# Patient Record
Sex: MALE | Race: WHITE | URBAN - METROPOLITAN AREA
[De-identification: names, ages, dates, MRNs, and addresses within clinical notes are randomized per-mention and may not be internally consistent; named-entity substitution may affect disease eponyms.]

---

## 2022-08-18 ENCOUNTER — NEW PATIENT (OUTPATIENT)
Dept: URBAN - METROPOLITAN AREA CLINIC 26 | Facility: CLINIC | Age: 52
End: 2022-08-18

## 2022-08-18 VITALS
SYSTOLIC BLOOD PRESSURE: 112 MMHG | WEIGHT: 205 LBS | HEART RATE: 80 BPM | BODY MASS INDEX: 28.7 KG/M2 | HEIGHT: 71 IN | DIASTOLIC BLOOD PRESSURE: 79 MMHG

## 2022-08-18 DIAGNOSIS — H43.393: ICD-10-CM

## 2022-08-18 DIAGNOSIS — H35.413: ICD-10-CM

## 2022-08-18 DIAGNOSIS — H35.461: ICD-10-CM

## 2022-08-18 DIAGNOSIS — H35.342: ICD-10-CM

## 2022-08-18 DIAGNOSIS — H35.373: ICD-10-CM

## 2022-08-18 PROCEDURE — 92134 CPTRZ OPH DX IMG PST SGM RTA: CPT

## 2022-08-18 PROCEDURE — 99204 OFFICE O/P NEW MOD 45 MIN: CPT

## 2022-08-18 PROCEDURE — 92250 FUNDUS PHOTOGRAPHY W/I&R: CPT

## 2022-08-18 ASSESSMENT — VISUAL ACUITY
OS_SC: 20/40-2
OD_SC: 20/40+1

## 2022-08-18 ASSESSMENT — TONOMETRY
OS_IOP_MMHG: 12
OD_IOP_MMHG: 11

## 2022-09-14 ENCOUNTER — FOLLOW UP (OUTPATIENT)
Dept: URBAN - METROPOLITAN AREA CLINIC 26 | Facility: CLINIC | Age: 52
End: 2022-09-14

## 2022-09-14 DIAGNOSIS — H35.461: ICD-10-CM

## 2022-09-14 DIAGNOSIS — H35.373: ICD-10-CM

## 2022-09-14 DIAGNOSIS — H35.343: ICD-10-CM

## 2022-09-14 DIAGNOSIS — H35.041: ICD-10-CM

## 2022-09-14 DIAGNOSIS — H35.413: ICD-10-CM

## 2022-09-14 DIAGNOSIS — H43.393: ICD-10-CM

## 2022-09-14 PROCEDURE — 92012 INTRM OPH EXAM EST PATIENT: CPT

## 2022-09-14 PROCEDURE — 92134 CPTRZ OPH DX IMG PST SGM RTA: CPT

## 2022-09-14 ASSESSMENT — VISUAL ACUITY
OS_SC: 20/50-2
OD_SC: 20/40-1
OD_PH: 20/30
OS_PH: 20/25-2

## 2022-09-14 ASSESSMENT — TONOMETRY
OD_IOP_MMHG: 14
OS_IOP_MMHG: 13

## 2022-12-16 ENCOUNTER — FOLLOW UP (OUTPATIENT)
Dept: URBAN - METROPOLITAN AREA CLINIC 33 | Facility: CLINIC | Age: 52
End: 2022-12-16

## 2022-12-16 VITALS — DIASTOLIC BLOOD PRESSURE: 92 MMHG | SYSTOLIC BLOOD PRESSURE: 131 MMHG | HEIGHT: 60 IN | HEART RATE: 83 BPM

## 2022-12-16 PROCEDURE — 92134 CPTRZ OPH DX IMG PST SGM RTA: CPT

## 2022-12-16 PROCEDURE — 92250 FUNDUS PHOTOGRAPHY W/I&R: CPT

## 2022-12-16 PROCEDURE — 92235 FLUORESCEIN ANGRPH MLTIFRAME: CPT

## 2022-12-16 PROCEDURE — 92012 INTRM OPH EXAM EST PATIENT: CPT

## 2022-12-16 ASSESSMENT — VISUAL ACUITY
OS_SC: 20/25+1
OD_SC: 20/20

## 2022-12-16 ASSESSMENT — TONOMETRY
OS_IOP_MMHG: 11
OD_IOP_MMHG: 12

## 2023-06-30 ENCOUNTER — COMPREHENSIVE EXAM (OUTPATIENT)
Dept: URBAN - METROPOLITAN AREA CLINIC 33 | Facility: CLINIC | Age: 53
End: 2023-06-30

## 2023-06-30 VITALS — WEIGHT: 218 LBS | BODY MASS INDEX: 30.86 KG/M2 | HEIGHT: 70.5 IN

## 2023-06-30 DIAGNOSIS — H35.461: ICD-10-CM

## 2023-06-30 DIAGNOSIS — H35.343: ICD-10-CM

## 2023-06-30 DIAGNOSIS — H35.413: ICD-10-CM

## 2023-06-30 DIAGNOSIS — H43.393: ICD-10-CM

## 2023-06-30 DIAGNOSIS — H35.041: ICD-10-CM

## 2023-06-30 DIAGNOSIS — H35.373: ICD-10-CM

## 2023-06-30 PROCEDURE — 92014 COMPRE OPH EXAM EST PT 1/>: CPT

## 2023-06-30 PROCEDURE — 92250 FUNDUS PHOTOGRAPHY W/I&R: CPT

## 2023-06-30 PROCEDURE — 92134 CPTRZ OPH DX IMG PST SGM RTA: CPT

## 2023-06-30 ASSESSMENT — TONOMETRY
OS_IOP_MMHG: 15
OD_IOP_MMHG: 16

## 2023-06-30 ASSESSMENT — VISUAL ACUITY
OS_CC: 20/20
OD_CC: 20/20-1

## 2023-12-04 ENCOUNTER — COMPREHENSIVE EXAM (OUTPATIENT)
Dept: URBAN - METROPOLITAN AREA CLINIC 33 | Facility: CLINIC | Age: 53
End: 2023-12-04

## 2023-12-04 DIAGNOSIS — H35.461: ICD-10-CM

## 2023-12-04 DIAGNOSIS — H35.343: ICD-10-CM

## 2023-12-04 DIAGNOSIS — H25.13: ICD-10-CM

## 2023-12-04 DIAGNOSIS — H02.831: ICD-10-CM

## 2023-12-04 DIAGNOSIS — H35.413: ICD-10-CM

## 2023-12-04 DIAGNOSIS — H02.834: ICD-10-CM

## 2023-12-04 DIAGNOSIS — H35.373: ICD-10-CM

## 2023-12-04 DIAGNOSIS — H43.393: ICD-10-CM

## 2023-12-04 DIAGNOSIS — H35.041: ICD-10-CM

## 2023-12-04 PROCEDURE — 92250 FUNDUS PHOTOGRAPHY W/I&R: CPT

## 2023-12-04 PROCEDURE — 99213 OFFICE O/P EST LOW 20 MIN: CPT

## 2023-12-04 PROCEDURE — 92134 CPTRZ OPH DX IMG PST SGM RTA: CPT

## 2023-12-04 ASSESSMENT — VISUAL ACUITY
OS_SC: 20/30-1
OD_SC: 20/40-2

## 2023-12-04 ASSESSMENT — TONOMETRY
OS_IOP_MMHG: 18
OD_IOP_MMHG: 14

## 2024-06-03 ENCOUNTER — COMPREHENSIVE EXAM (OUTPATIENT)
Dept: URBAN - METROPOLITAN AREA CLINIC 33 | Facility: CLINIC | Age: 54
End: 2024-06-03

## 2024-06-03 ENCOUNTER — TELEPHONE ENCOUNTER (OUTPATIENT)
Dept: URBAN - METROPOLITAN AREA CLINIC 7 | Facility: CLINIC | Age: 54
End: 2024-06-03

## 2024-06-03 DIAGNOSIS — H43.393: ICD-10-CM

## 2024-06-03 DIAGNOSIS — H35.461: ICD-10-CM

## 2024-06-03 DIAGNOSIS — H35.041: ICD-10-CM

## 2024-06-03 DIAGNOSIS — H25.13: ICD-10-CM

## 2024-06-03 DIAGNOSIS — H35.343: ICD-10-CM

## 2024-06-03 DIAGNOSIS — H35.413: ICD-10-CM

## 2024-06-03 DIAGNOSIS — H02.831: ICD-10-CM

## 2024-06-03 DIAGNOSIS — H35.373: ICD-10-CM

## 2024-06-03 DIAGNOSIS — H02.834: ICD-10-CM

## 2024-06-03 PROCEDURE — 92250 FUNDUS PHOTOGRAPHY W/I&R: CPT

## 2024-06-03 PROCEDURE — 99213 OFFICE O/P EST LOW 20 MIN: CPT

## 2024-06-03 PROCEDURE — 92134 CPTRZ OPH DX IMG PST SGM RTA: CPT

## 2024-06-03 PROCEDURE — 92201 OPSCPY EXTND RTA DRAW UNI/BI: CPT

## 2024-06-03 RX ORDER — SOD SULF/POT CHLORIDE/MAG SULF 1.479 G
12 TABLETS THE FIRST DOSE THE EVENING BEFORE AND SECOND DOSE THE MORNING OF COLONOSCOPY TABLET ORAL TWICE A DAY
Qty: 24 | Refills: 0 | OUTPATIENT
Start: 2024-06-04 | End: 2024-06-05

## 2024-06-03 ASSESSMENT — TONOMETRY
OD_IOP_MMHG: 14
OS_IOP_MMHG: 12

## 2024-06-03 ASSESSMENT — VISUAL ACUITY
OS_CC: 20/25-1
OD_CC: 20/20-1

## 2024-07-17 ENCOUNTER — TELEPHONE ENCOUNTER (OUTPATIENT)
Dept: URBAN - METROPOLITAN AREA CLINIC 6 | Facility: CLINIC | Age: 54
End: 2024-07-17

## 2024-07-17 ENCOUNTER — TELEPHONE ENCOUNTER (OUTPATIENT)
Dept: URBAN - METROPOLITAN AREA CLINIC 7 | Facility: CLINIC | Age: 54
End: 2024-07-17

## 2024-07-18 ENCOUNTER — LAB OUTSIDE AN ENCOUNTER (OUTPATIENT)
Dept: URBAN - METROPOLITAN AREA SURGERY CENTER 5 | Facility: SURGERY CENTER | Age: 54
End: 2024-07-18

## 2024-07-19 ENCOUNTER — CLAIMS CREATED FROM THE CLAIM WINDOW (OUTPATIENT)
Dept: URBAN - METROPOLITAN AREA SURGERY CENTER 5 | Facility: SURGERY CENTER | Age: 54
End: 2024-07-19
Payer: COMMERCIAL

## 2024-07-19 ENCOUNTER — CLAIMS CREATED FROM THE CLAIM WINDOW (OUTPATIENT)
Dept: URBAN - METROPOLITAN AREA CLINIC 4 | Facility: CLINIC | Age: 54
End: 2024-07-19
Payer: COMMERCIAL

## 2024-07-19 DIAGNOSIS — K63.5 POLYP OF DESCENDING COLON, UNSPECIFIED TYPE: ICD-10-CM

## 2024-07-19 DIAGNOSIS — D12.4 BENIGN NEOPLASM OF DESCENDING COLON: ICD-10-CM

## 2024-07-19 DIAGNOSIS — Z86.010 PERSONAL HISTORY OF COLON POLYPS: ICD-10-CM

## 2024-07-19 DIAGNOSIS — K64.0 FIRST DEGREE HEMORRHOIDS: ICD-10-CM

## 2024-07-19 DIAGNOSIS — K64.0 GRADE I HEMORRHOIDS: ICD-10-CM

## 2024-07-19 DIAGNOSIS — Z86.010 PERSONAL HISTORY OF COLONIC POLYPS: ICD-10-CM

## 2024-07-19 DIAGNOSIS — Z12.11 ENCOUNTER FOR COLONOSCOPY DUE TO HISTORY OF COLONIC POLYP: ICD-10-CM

## 2024-07-19 PROCEDURE — 45385 COLONOSCOPY W/LESION REMOVAL: CPT | Performed by: INTERNAL MEDICINE

## 2024-07-19 PROCEDURE — 00812 ANES LWR INTST SCR COLSC: CPT | Performed by: INTERNAL MEDICINE

## 2024-07-19 PROCEDURE — 88305 TISSUE EXAM BY PATHOLOGIST: CPT | Performed by: PATHOLOGY

## 2024-08-30 ENCOUNTER — APPOINTMENT (RX ONLY)
Dept: URBAN - METROPOLITAN AREA CLINIC 334 | Facility: CLINIC | Age: 54
Setting detail: DERMATOLOGY
End: 2024-08-30

## 2024-08-30 DIAGNOSIS — L57.0 ACTINIC KERATOSIS: ICD-10-CM | Status: INADEQUATELY CONTROLLED

## 2024-08-30 DIAGNOSIS — B35.4 TINEA CORPORIS: ICD-10-CM | Status: INADEQUATELY CONTROLLED

## 2024-08-30 PROCEDURE — ? LIQUID NITROGEN

## 2024-08-30 PROCEDURE — ? COUNSELING

## 2024-08-30 PROCEDURE — 99203 OFFICE O/P NEW LOW 30 MIN: CPT | Mod: 25

## 2024-08-30 PROCEDURE — ? PRESCRIPTION

## 2024-08-30 PROCEDURE — 17000 DESTRUCT PREMALG LESION: CPT

## 2024-08-30 RX ORDER — ECONAZOLE NITRATE 10 MG/G
CREAM TOPICAL QD
Qty: 85 | Refills: 1 | Status: CANCELLED | COMMUNITY
Start: 2024-08-30

## 2024-08-30 RX ORDER — TRIAMCINOLONE ACETONIDE 1 MG/G
CREAM TOPICAL QD
Qty: 80 | Refills: 1 | Status: ERX | COMMUNITY
Start: 2024-08-30

## 2024-08-30 RX ORDER — KETOCONAZOLE 20 MG/G
CREAM TOPICAL QD
Qty: 60 | Refills: 2 | Status: ERX | COMMUNITY
Start: 2024-08-30

## 2024-08-30 RX ADMIN — TRIAMCINOLONE ACETONIDE: 1 CREAM TOPICAL at 00:00

## 2024-08-30 RX ADMIN — KETOCONAZOLE: 20 CREAM TOPICAL at 00:00

## 2024-08-30 RX ADMIN — ECONAZOLE NITRATE: 10 CREAM TOPICAL at 00:00

## 2024-08-30 ASSESSMENT — LOCATION SIMPLE DESCRIPTION DERM
LOCATION SIMPLE: RIGHT THIGH
LOCATION SIMPLE: RIGHT CHEEK

## 2024-08-30 ASSESSMENT — LOCATION DETAILED DESCRIPTION DERM
LOCATION DETAILED: RIGHT ANTERIOR LATERAL PROXIMAL THIGH
LOCATION DETAILED: RIGHT CENTRAL MALAR CHEEK

## 2024-08-30 ASSESSMENT — LOCATION ZONE DERM
LOCATION ZONE: LEG
LOCATION ZONE: FACE

## 2024-08-30 NOTE — PROCEDURE: LIQUID NITROGEN
Consent: The patient's consent was obtained including but not limited to risks of crusting, scabbing, blistering, scarring, darker or lighter pigmentary change, recurrence, incomplete removal and infection.
Show Aperture Variable?: Yes
Number Of Freeze-Thaw Cycles: 1 freeze-thaw cycle
Duration Of Freeze Thaw-Cycle (Seconds): 1
Post-Care Instructions: I reviewed with the patient in detail post-care instructions. Patient is to wear sunprotection, and avoid picking at any of the treated lesions. Pt may apply Vaseline to crusted or scabbing areas.
Application Tool (Optional): Liquid Nitrogen Sprayer
Render Note In Bullet Format When Appropriate: No
Detail Level: Detailed

## 2025-01-31 ENCOUNTER — TELEPHONE ENCOUNTER (OUTPATIENT)
Dept: URBAN - METROPOLITAN AREA CLINIC 7 | Facility: CLINIC | Age: 55
End: 2025-01-31

## 2025-01-31 ENCOUNTER — LAB OUTSIDE AN ENCOUNTER (OUTPATIENT)
Dept: URBAN - METROPOLITAN AREA CLINIC 7 | Facility: CLINIC | Age: 55
End: 2025-01-31

## 2025-02-03 ENCOUNTER — CLAIMS CREATED FROM THE CLAIM WINDOW (OUTPATIENT)
Dept: URBAN - METROPOLITAN AREA CLINIC 4 | Facility: CLINIC | Age: 55
End: 2025-02-03
Payer: COMMERCIAL

## 2025-02-03 ENCOUNTER — OFFICE VISIT (OUTPATIENT)
Dept: URBAN - METROPOLITAN AREA SURGERY CENTER 5 | Facility: SURGERY CENTER | Age: 55
End: 2025-02-03
Payer: COMMERCIAL

## 2025-02-03 DIAGNOSIS — K22.89 OTHER SPECIFIED DISEASE OF ESOPHAGUS: ICD-10-CM

## 2025-02-03 DIAGNOSIS — R93.3 ABNORMAL FINDINGS ON DIAGNOSTIC IMAGING OF OTHER PARTS OF DIGESTIVE TRACT: ICD-10-CM

## 2025-02-03 DIAGNOSIS — K29.70 CHRONIC GASTRITIS: ICD-10-CM

## 2025-02-03 DIAGNOSIS — K29.50 CHRONIC GASTRITIS WITHOUT BLEEDING, UNSPECIFIED GASTRITIS TYPE: ICD-10-CM

## 2025-02-03 DIAGNOSIS — K29.70 GASTRITIS, UNSPECIFIED, WITHOUT BLEEDING: ICD-10-CM

## 2025-02-03 PROCEDURE — 43239 EGD BIOPSY SINGLE/MULTIPLE: CPT | Performed by: INTERNAL MEDICINE

## 2025-02-03 PROCEDURE — 88305 TISSUE EXAM BY PATHOLOGIST: CPT | Performed by: PATHOLOGY

## 2025-02-03 PROCEDURE — 88342 IMHCHEM/IMCYTCHM 1ST ANTB: CPT | Performed by: PATHOLOGY

## 2025-02-03 PROCEDURE — 00731 ANES UPR GI NDSC PX NOS: CPT | Performed by: NURSE ANESTHETIST, CERTIFIED REGISTERED

## 2025-02-05 ENCOUNTER — TELEPHONE ENCOUNTER (OUTPATIENT)
Dept: URBAN - METROPOLITAN AREA SURGERY CENTER 5 | Facility: SURGERY CENTER | Age: 55
End: 2025-02-05

## 2025-02-06 ENCOUNTER — TELEPHONE ENCOUNTER (OUTPATIENT)
Dept: URBAN - METROPOLITAN AREA CLINIC 9 | Facility: CLINIC | Age: 55
End: 2025-02-06

## 2025-02-14 ENCOUNTER — OFFICE VISIT (OUTPATIENT)
Dept: URBAN - METROPOLITAN AREA SURGERY CENTER 9 | Facility: SURGERY CENTER | Age: 55
End: 2025-02-14

## 2025-02-18 ENCOUNTER — TELEPHONE ENCOUNTER (OUTPATIENT)
Dept: URBAN - METROPOLITAN AREA SURGERY CENTER 9 | Facility: SURGERY CENTER | Age: 55
End: 2025-02-18

## 2025-02-28 ENCOUNTER — OFFICE VISIT (OUTPATIENT)
Dept: URBAN - METROPOLITAN AREA SURGERY CENTER 9 | Facility: SURGERY CENTER | Age: 55
End: 2025-02-28

## 2025-05-19 ENCOUNTER — COMPREHENSIVE EXAM (OUTPATIENT)
Age: 55
End: 2025-05-19

## 2025-05-19 VITALS — HEIGHT: 60 IN | HEART RATE: 82 BPM | DIASTOLIC BLOOD PRESSURE: 82 MMHG | SYSTOLIC BLOOD PRESSURE: 128 MMHG

## 2025-05-19 DIAGNOSIS — H02.831: ICD-10-CM

## 2025-05-19 DIAGNOSIS — H02.834: ICD-10-CM

## 2025-05-19 DIAGNOSIS — H35.041: ICD-10-CM

## 2025-05-19 DIAGNOSIS — H35.461: ICD-10-CM

## 2025-05-19 DIAGNOSIS — H35.373: ICD-10-CM

## 2025-05-19 DIAGNOSIS — H35.413: ICD-10-CM

## 2025-05-19 DIAGNOSIS — H43.393: ICD-10-CM

## 2025-05-19 DIAGNOSIS — H25.13: ICD-10-CM

## 2025-05-19 DIAGNOSIS — H35.343: ICD-10-CM

## 2025-05-19 PROCEDURE — 92250 FUNDUS PHOTOGRAPHY W/I&R: CPT

## 2025-05-19 PROCEDURE — 92134 CPTRZ OPH DX IMG PST SGM RTA: CPT

## 2025-05-19 PROCEDURE — 92014 COMPRE OPH EXAM EST PT 1/>: CPT
